# Patient Record
Sex: MALE | Race: WHITE | NOT HISPANIC OR LATINO | URBAN - METROPOLITAN AREA
[De-identification: names, ages, dates, MRNs, and addresses within clinical notes are randomized per-mention and may not be internally consistent; named-entity substitution may affect disease eponyms.]

---

## 2018-04-09 ENCOUNTER — LAB REQUISITION (OUTPATIENT)
Dept: LAB | Facility: HOSPITAL | Age: 63
End: 2018-04-09

## 2018-04-09 DIAGNOSIS — C44.310 BASAL CELL CARCINOMA OF SKIN OF FACE: ICD-10-CM

## 2018-04-09 PROCEDURE — 88332 PATH CONSLTJ SURG EA ADD BLK: CPT | Performed by: PATHOLOGY

## 2018-04-09 PROCEDURE — 88305 TISSUE EXAM BY PATHOLOGIST: CPT | Performed by: PLASTIC SURGERY

## 2018-04-10 LAB
CASE RPRT: NORMAL
CLINICAL INFO: NORMAL
PATH REPORT.FINAL DX SPEC: NORMAL
PATH REPORT.GROSS SPEC: NORMAL
PATH REPORT.INTRAOP OBS SPEC DOC: NORMAL

## 2019-09-23 ENCOUNTER — FORM ENCOUNTER (OUTPATIENT)
Age: 64
End: 2019-09-23

## 2019-09-24 ENCOUNTER — OUTPATIENT (OUTPATIENT)
Dept: OUTPATIENT SERVICES | Facility: HOSPITAL | Age: 64
LOS: 1 days | End: 2019-09-24
Payer: COMMERCIAL

## 2019-09-24 ENCOUNTER — APPOINTMENT (OUTPATIENT)
Dept: ORTHOPEDIC SURGERY | Facility: CLINIC | Age: 64
End: 2019-09-24
Payer: COMMERCIAL

## 2019-09-24 VITALS
WEIGHT: 210 LBS | DIASTOLIC BLOOD PRESSURE: 80 MMHG | BODY MASS INDEX: 29.4 KG/M2 | HEIGHT: 71 IN | SYSTOLIC BLOOD PRESSURE: 130 MMHG

## 2019-09-24 DIAGNOSIS — M25.562 PAIN IN LEFT KNEE: ICD-10-CM

## 2019-09-24 PROBLEM — Z00.00 ENCOUNTER FOR PREVENTIVE HEALTH EXAMINATION: Status: ACTIVE | Noted: 2019-09-24

## 2019-09-24 PROCEDURE — 73564 X-RAY EXAM KNEE 4 OR MORE: CPT | Mod: 26,50

## 2019-09-24 PROCEDURE — 73564 X-RAY EXAM KNEE 4 OR MORE: CPT

## 2019-09-24 PROCEDURE — 99203 OFFICE O/P NEW LOW 30 MIN: CPT

## 2019-10-03 PROBLEM — M25.562 LEFT MEDIAL KNEE PAIN: Status: ACTIVE | Noted: 2019-10-03

## 2019-10-03 NOTE — PHYSICAL EXAM
[de-identified] : The patient is a well developed, well nourished male in no apparent distress. He is alert and oriented X 3 with a pleasant mood and appropriate affect. \par \par On physical examination of the left knee, his ROM is 0-125 degrees. The patient walks with a normal gait and stands in neutral alignment. There is no effusion. No warmth or erythema is noted. The patella is non tender to palpation medially or laterally. There is no crepitus noted. The apprehension and grind tests are negative. The extensor mechanism is intact. There is medial joint line tenderness. The Bailey sign is absent. The Lachman and pivot shift tests are negative. There is no varus or valgus laxity at 0 or 30 degrees. No posterolateral or anteromedial laxity is noted. No masses are palpable. No other soft tissue or bony tenderness is noted. Quadriceps weakness is noted. Neurovascular function is intact.   [de-identified] : Radiographs show well aligned and well maintained joint spacing bilaterally. There is hardware from previous ligamentous reconstructions

## 2019-10-03 NOTE — DISCUSSION/SUMMARY
[de-identified] : Mr Caballero has medial knee discomfort consistent with a medial meniscus tear. He will continue on with activities as tolerated. he will modify his home exercise program. If unimproved we will consider an MRI. All questions were answered. He will call if any issues arise.

## 2019-10-03 NOTE — END OF VISIT
[FreeTextEntry3] : All medical record entries made by IAN Curran, acting as a scribe for this encounter under the direction of Marco Sebastian MD . I have reviewed the chart and agree that the record accurately reflects my personal performance of the history, physical exam, assessment and plan. I have also personally directed, reviewed, and agreed with the chart.

## 2019-10-03 NOTE — HISTORY OF PRESENT ILLNESS
[de-identified] : Hany Caballero is a 65 yo gentleman with left knee pain for the last few months. He had ACL, PCL, MCL reconstruction in 1989. He has remained quite active over the years. He plays a wide variety of sports without issue. Several months ago he developed medial knee pain. He has had discomfort and stiffness. He denies any swelling, locking or buckling. He takes advil with some relief.

## 2022-07-30 ENCOUNTER — APPOINTMENT (EMERGENCY)
Dept: RADIOLOGY | Facility: HOSPITAL | Age: 67
End: 2022-07-30
Attending: EMERGENCY MEDICINE
Payer: COMMERCIAL

## 2022-07-30 ENCOUNTER — HOSPITAL ENCOUNTER (OUTPATIENT)
Facility: HOSPITAL | Age: 67
Setting detail: OBSERVATION
Discharge: HOME | End: 2022-08-01
Attending: EMERGENCY MEDICINE | Admitting: HOSPITALIST
Payer: COMMERCIAL

## 2022-07-30 DIAGNOSIS — C61 PROSTATE CANCER (CMS/HCC): ICD-10-CM

## 2022-07-30 DIAGNOSIS — I26.99 PULMONARY EMBOLISM, OTHER, UNSPECIFIED CHRONICITY, UNSPECIFIED WHETHER ACUTE COR PULMONALE PRESENT (CMS/HCC): ICD-10-CM

## 2022-07-30 DIAGNOSIS — I48.20 CHRONIC ATRIAL FIBRILLATION (CMS/HCC): ICD-10-CM

## 2022-07-30 DIAGNOSIS — R06.02 SHORTNESS OF BREATH: Primary | ICD-10-CM

## 2022-07-30 DIAGNOSIS — I26.99 ACUTE PULMONARY EMBOLISM WITHOUT ACUTE COR PULMONALE, UNSPECIFIED PULMONARY EMBOLISM TYPE (CMS/HCC): ICD-10-CM

## 2022-07-30 PROBLEM — E78.5 DYSLIPIDEMIA: Status: ACTIVE | Noted: 2022-07-30

## 2022-07-30 LAB
ALBUMIN SERPL-MCNC: 3.8 G/DL (ref 3.4–5)
ALP SERPL-CCNC: 68 IU/L (ref 35–126)
ALT SERPL-CCNC: 55 IU/L (ref 16–63)
ANION GAP SERPL CALC-SCNC: 8 MEQ/L (ref 3–15)
APTT PPP: 35 SEC (ref 23–35)
AST SERPL-CCNC: 45 IU/L (ref 15–41)
BASOPHILS # BLD: 0.04 K/UL (ref 0.01–0.1)
BASOPHILS NFR BLD: 0.3 %
BILIRUB SERPL-MCNC: 1 MG/DL (ref 0.3–1.2)
BNP SERPL-MCNC: 97 PG/ML
BUN SERPL-MCNC: 20 MG/DL (ref 8–20)
CALCIUM SERPL-MCNC: 8.8 MG/DL (ref 8.9–10.3)
CHLORIDE SERPL-SCNC: 101 MEQ/L (ref 98–109)
CO2 SERPL-SCNC: 26 MEQ/L (ref 22–32)
CREAT SERPL-MCNC: 1.6 MG/DL (ref 0.8–1.3)
D DIMER PPP IA.FEU-MCNC: >20 UG/ML FEU (ref 0–0.5)
DIFFERENTIAL METHOD BLD: ABNORMAL
EOSINOPHIL # BLD: 0.28 K/UL (ref 0.04–0.54)
EOSINOPHIL NFR BLD: 2.4 %
ERYTHROCYTE [DISTWIDTH] IN BLOOD BY AUTOMATED COUNT: 13.5 % (ref 11.6–14.4)
GFR SERPL CREATININE-BSD FRML MDRD: 43.6 ML/MIN/1.73M*2
GLUCOSE SERPL-MCNC: 136 MG/DL (ref 70–99)
HCT VFR BLDCO AUTO: 43.4 % (ref 40.1–51)
HGB BLD-MCNC: 14.1 G/DL (ref 13.7–17.5)
IMM GRANULOCYTES # BLD AUTO: 0.06 K/UL (ref 0–0.08)
IMM GRANULOCYTES NFR BLD AUTO: 0.5 %
INR PPP: 1.1
LYMPHOCYTES # BLD: 0.97 K/UL (ref 1.2–3.5)
LYMPHOCYTES NFR BLD: 8.3 %
MCH RBC QN AUTO: 28.3 PG (ref 28–33.2)
MCHC RBC AUTO-ENTMCNC: 32.5 G/DL (ref 32.2–36.5)
MCV RBC AUTO: 87 FL (ref 83–98)
MONOCYTES # BLD: 1.03 K/UL (ref 0.3–1)
MONOCYTES NFR BLD: 8.8 %
NEUTROPHILS # BLD: 9.35 K/UL (ref 1.7–7)
NEUTS SEG NFR BLD: 79.7 %
NRBC BLD-RTO: 0 %
PDW BLD AUTO: 9.9 FL (ref 9.4–12.4)
PLATELET # BLD AUTO: 185 K/UL (ref 150–350)
POTASSIUM SERPL-SCNC: 4.2 MEQ/L (ref 3.6–5.1)
PROT SERPL-MCNC: 6.5 G/DL (ref 6–8.2)
PROTHROMBIN TIME: 14.3 SEC (ref 12.2–14.5)
RBC # BLD AUTO: 4.99 M/UL (ref 4.5–5.8)
SARS-COV-2 RNA RESP QL NAA+PROBE: NEGATIVE
SODIUM SERPL-SCNC: 135 MEQ/L (ref 136–144)
TROPONIN I SERPL HS-MCNC: 7.7 PG/ML
TROPONIN I SERPL HS-MCNC: 8.6 PG/ML
WBC # BLD AUTO: 11.73 K/UL (ref 3.8–10.5)

## 2022-07-30 PROCEDURE — 99284 EMERGENCY DEPT VISIT MOD MDM: CPT | Mod: 25

## 2022-07-30 PROCEDURE — 99285 EMERGENCY DEPT VISIT HI MDM: CPT | Mod: 25

## 2022-07-30 PROCEDURE — G0378 HOSPITAL OBSERVATION PER HR: HCPCS

## 2022-07-30 PROCEDURE — 85025 COMPLETE CBC W/AUTO DIFF WBC: CPT | Performed by: PHYSICIAN ASSISTANT

## 2022-07-30 PROCEDURE — 71045 X-RAY EXAM CHEST 1 VIEW: CPT

## 2022-07-30 PROCEDURE — 96366 THER/PROPH/DIAG IV INF ADDON: CPT

## 2022-07-30 PROCEDURE — 63600105 HC IODINE BASED CONTRAST: Performed by: EMERGENCY MEDICINE

## 2022-07-30 PROCEDURE — 99220 PR INITIAL OBSERVATION CARE/DAY 70 MINUTES: CPT | Performed by: HOSPITALIST

## 2022-07-30 PROCEDURE — 96365 THER/PROPH/DIAG IV INF INIT: CPT | Mod: 59

## 2022-07-30 PROCEDURE — 63600000 HC DRUGS/DETAIL CODE: Performed by: EMERGENCY MEDICINE

## 2022-07-30 PROCEDURE — 83880 ASSAY OF NATRIURETIC PEPTIDE: CPT | Performed by: PHYSICIAN ASSISTANT

## 2022-07-30 PROCEDURE — U0003 INFECTIOUS AGENT DETECTION BY NUCLEIC ACID (DNA OR RNA); SEVERE ACUTE RESPIRATORY SYNDROME CORONAVIRUS 2 (SARS-COV-2) (CORONAVIRUS DISEASE [COVID-19]), AMPLIFIED PROBE TECHNIQUE, MAKING USE OF HIGH THROUGHPUT TECHNOLOGIES AS DESCRIBED BY CMS-2020-01-R: HCPCS | Performed by: EMERGENCY MEDICINE

## 2022-07-30 PROCEDURE — 84484 ASSAY OF TROPONIN QUANT: CPT | Performed by: PHYSICIAN ASSISTANT

## 2022-07-30 PROCEDURE — 12000000 HC ROOM AND CARE MED/SURG

## 2022-07-30 PROCEDURE — 93005 ELECTROCARDIOGRAM TRACING: CPT | Performed by: PHYSICIAN ASSISTANT

## 2022-07-30 PROCEDURE — 71275 CT ANGIOGRAPHY CHEST: CPT | Mod: ME

## 2022-07-30 PROCEDURE — 85610 PROTHROMBIN TIME: CPT | Performed by: PHYSICIAN ASSISTANT

## 2022-07-30 PROCEDURE — 84484 ASSAY OF TROPONIN QUANT: CPT | Mod: 91 | Performed by: PHYSICIAN ASSISTANT

## 2022-07-30 PROCEDURE — 36415 COLL VENOUS BLD VENIPUNCTURE: CPT | Performed by: PHYSICIAN ASSISTANT

## 2022-07-30 PROCEDURE — 80053 COMPREHEN METABOLIC PANEL: CPT | Performed by: PHYSICIAN ASSISTANT

## 2022-07-30 PROCEDURE — 85379 FIBRIN DEGRADATION QUANT: CPT | Performed by: PHYSICIAN ASSISTANT

## 2022-07-30 PROCEDURE — 85730 THROMBOPLASTIN TIME PARTIAL: CPT | Performed by: EMERGENCY MEDICINE

## 2022-07-30 RX ORDER — AMOXICILLIN 250 MG
1 CAPSULE ORAL 2 TIMES DAILY
Status: DISCONTINUED | OUTPATIENT
Start: 2022-07-30 | End: 2022-08-01 | Stop reason: HOSPADM

## 2022-07-30 RX ORDER — OXYCODONE HYDROCHLORIDE 5 MG/1
5 TABLET ORAL EVERY 4 HOURS PRN
COMMUNITY

## 2022-07-30 RX ORDER — ATORVASTATIN CALCIUM 80 MG/1
80 TABLET, FILM COATED ORAL
COMMUNITY
Start: 2022-07-12

## 2022-07-30 RX ORDER — IBUPROFEN 200 MG
16-32 TABLET ORAL AS NEEDED
Status: DISCONTINUED | OUTPATIENT
Start: 2022-07-30 | End: 2022-08-01 | Stop reason: HOSPADM

## 2022-07-30 RX ORDER — DEXTROSE 50 % IN WATER (D50W) INTRAVENOUS SYRINGE
25 AS NEEDED
Status: DISCONTINUED | OUTPATIENT
Start: 2022-07-30 | End: 2022-08-01 | Stop reason: HOSPADM

## 2022-07-30 RX ORDER — DILTIAZEM HYDROCHLORIDE 240 MG/1
240 CAPSULE, COATED, EXTENDED RELEASE ORAL DAILY
Status: DISCONTINUED | OUTPATIENT
Start: 2022-07-31 | End: 2022-08-01 | Stop reason: HOSPADM

## 2022-07-30 RX ORDER — ACETAMINOPHEN 500 MG
500 TABLET ORAL 2 TIMES DAILY
COMMUNITY

## 2022-07-30 RX ORDER — OXYBUTYNIN CHLORIDE 5 MG/1
5 TABLET ORAL 3 TIMES DAILY
Status: DISCONTINUED | OUTPATIENT
Start: 2022-07-31 | End: 2022-08-01 | Stop reason: HOSPADM

## 2022-07-30 RX ORDER — DEXTROSE 40 %
15-30 GEL (GRAM) ORAL AS NEEDED
Status: DISCONTINUED | OUTPATIENT
Start: 2022-07-30 | End: 2022-08-01 | Stop reason: HOSPADM

## 2022-07-30 RX ORDER — OXYCODONE HYDROCHLORIDE 5 MG/1
5 TABLET ORAL EVERY 4 HOURS PRN
Status: DISCONTINUED | OUTPATIENT
Start: 2022-07-30 | End: 2022-08-01 | Stop reason: HOSPADM

## 2022-07-30 RX ORDER — OXYBUTYNIN CHLORIDE 5 MG/1
5 TABLET ORAL 3 TIMES DAILY
COMMUNITY

## 2022-07-30 RX ORDER — HEPARIN SODIUM 10000 [USP'U]/100ML
100-4000 INJECTION, SOLUTION INTRAVENOUS
Status: DISCONTINUED | OUTPATIENT
Start: 2022-07-30 | End: 2022-07-31

## 2022-07-30 RX ORDER — DILTIAZEM HYDROCHLORIDE 240 MG/1
CAPSULE, EXTENDED RELEASE ORAL
Status: ON HOLD | COMMUNITY
Start: 2022-05-08 | End: 2022-08-01

## 2022-07-30 RX ORDER — LEVOFLOXACIN 500 MG/1
500 TABLET, FILM COATED ORAL DAILY
COMMUNITY
End: 2022-08-01 | Stop reason: HOSPADM

## 2022-07-30 RX ORDER — ATORVASTATIN CALCIUM 80 MG/1
80 TABLET, FILM COATED ORAL
Status: DISCONTINUED | OUTPATIENT
Start: 2022-07-31 | End: 2022-08-01 | Stop reason: HOSPADM

## 2022-07-30 RX ORDER — AMOXICILLIN 250 MG
1 CAPSULE ORAL 2 TIMES DAILY
COMMUNITY

## 2022-07-30 RX ADMIN — IOHEXOL 100 ML: 350 INJECTION, SOLUTION INTRAVENOUS at 18:48

## 2022-07-30 RX ADMIN — HEPARIN SODIUM 1550 UNITS/HR: 10000 INJECTION, SOLUTION INTRAVENOUS at 20:18

## 2022-07-30 ASSESSMENT — ENCOUNTER SYMPTOMS
BLOOD IN STOOL: 0
SHORTNESS OF BREATH: 1
ARTHRALGIAS: 0
DIAPHORESIS: 0
DYSURIA: 0
FLANK PAIN: 0
CHILLS: 0
EYE PAIN: 0
SORE THROAT: 0
ABDOMINAL PAIN: 1
BACK PAIN: 0
HEMOPTYSIS: 0
COLOR CHANGE: 0
SYNCOPE: 0
COUGH: 0
DIZZINESS: 0
HEMATURIA: 0
FEVER: 0
SEIZURES: 0
PALPITATIONS: 0
HEADACHES: 0
NAUSEA: 0
FREQUENCY: 0
VOMITING: 0
SPUTUM PRODUCTION: 0
LIGHT-HEADEDNESS: 0

## 2022-07-30 NOTE — ED PROVIDER NOTES
Emergency Medicine Note  HPI   HISTORY OF PRESENT ILLNESS     67yo M w/hx of MARLENI-opal, on eliquis,  POD#4 s/p prostatectomy, c/o SOB that started today after pt returned from a walk. Pt states he went out for a walk as insructed by his surgeon and became fatigued, returned home and went to lie down in bed @~10am. Shortly after lying down, pt had sudden onset of SOB, which has been waxing and waning x 4 hours.      History provided by:  Patient  Shortness of Breath  Severity:  Moderate  Onset quality:  Sudden  Duration:  4 hours  Timing:  Constant  Progression:  Waxing and waning  Chronicity:  New  Relieved by:  Nothing  Exacerbated by: lying flat.  Ineffective treatments:  None tried  Associated symptoms: abdominal pain (Post-op pain, unchanged today)    Associated symptoms: no chest pain, no cough, no diaphoresis, no ear pain, no fever, no headaches, no hemoptysis, no rash, no sore throat, no sputum production, no syncope and no vomiting          Patient History   PAST HISTORY     Reviewed from Nursing Triage:         Past Medical History:   Diagnosis Date   • Prostate CA (CMS/HCC)        No past surgical history on file.    No family history on file.    Social History     Tobacco Use   • Smoking status: Never Smoker   • Smokeless tobacco: Never Used   Substance Use Topics   • Alcohol use: Not Currently         Review of Systems   REVIEW OF SYSTEMS     Review of Systems   Constitutional: Negative for chills, diaphoresis and fever.   HENT: Negative for ear pain and sore throat.    Eyes: Negative for pain and visual disturbance.   Respiratory: Positive for shortness of breath. Negative for cough, hemoptysis and sputum production.    Cardiovascular: Negative for chest pain, palpitations, leg swelling and syncope.   Gastrointestinal: Positive for abdominal pain (Post-op pain, unchanged today). Negative for blood in stool, nausea and vomiting.   Genitourinary: Negative for dysuria, flank pain, frequency, hematuria and  urgency.   Musculoskeletal: Negative for arthralgias and back pain.   Skin: Negative for color change and rash.   Neurological: Negative for dizziness, seizures, syncope, light-headedness and headaches.   All other systems reviewed and are negative.        VITALS     ED Vitals    Date/Time Temp Pulse Resp BP SpO2 [facility]   07/30/22 2015 -- 86 20 135/104 97 % DT   07/30/22 1910 37 °C (98.6 °F) 89 19 144/108 95 % DT   07/30/22 1822 -- 86 12 138/95 96 % ML   07/30/22 1536 36.7 °C (98 °F) 97 20 119/74 97 % MG        Pulse Ox %: 95 % (07/30/22 2047)  Pulse Ox Interpretation: Normal (07/30/22 2047)  Heart Rate: 89 (07/30/22 2047)  Rhythm Strip Interpretation: Atrial Fibrillation (07/30/22 2047)     Physical Exam   PHYSICAL EXAM     Physical Exam  Vitals and nursing note reviewed.   Constitutional:       General: He is not in acute distress.     Appearance: He is well-developed. He is not toxic-appearing.   HENT:      Head: Normocephalic and atraumatic.      Mouth/Throat:      Mouth: Mucous membranes are moist.      Pharynx: No oropharyngeal exudate or posterior oropharyngeal erythema.   Eyes:      Conjunctiva/sclera: Conjunctivae normal.      Pupils: Pupils are equal, round, and reactive to light.   Cardiovascular:      Rate and Rhythm: Normal rate. Rhythm irregularly irregular.      Pulses: Normal pulses.      Heart sounds: Normal heart sounds. No murmur heard.  Pulmonary:      Effort: Pulmonary effort is normal. No respiratory distress.      Breath sounds: Normal breath sounds.   Abdominal:      General: Bowel sounds are normal.      Palpations: Abdomen is soft.      Tenderness: There is abdominal tenderness (Mild, lower abdomen adjacent to surgical incisions).      Comments: +transverse incisions @lower abdomen x3, no erythema/swelling/discharge.   Musculoskeletal:         General: No swelling or tenderness. Normal range of motion.      Cervical back: Normal range of motion and neck supple.      Right lower leg: No edema.       Left lower leg: No edema.   Skin:     General: Skin is warm and dry.      Coloration: Skin is not pale.      Findings: No rash.   Neurological:      General: No focal deficit present.      Mental Status: He is alert and oriented to person, place, and time.      Cranial Nerves: No cranial nerve deficit.      Sensory: No sensory deficit.      Motor: No weakness.           PROCEDURES     Critical Care  Performed by: Taran Mack DO  Authorized by: Taran Mack DO     Critical care provider statement:     Critical care time (minutes):  35    Critical care time was exclusive of:  Separately billable procedures and treating other patients    Critical care was necessary to treat or prevent imminent or life-threatening deterioration of the following conditions: Pulmonary embolism.    Critical care was time spent personally by me on the following activities:  Development of treatment plan with patient or surrogate, discussions with consultants, evaluation of patient's response to treatment, examination of patient, obtaining history from patient or surrogate, ordering and performing treatments and interventions, ordering and review of laboratory studies, ordering and review of radiographic studies, pulse oximetry and re-evaluation of patient's condition         DATA     Results     Procedure Component Value Units Date/Time    PTT [[id number]]  (Normal) Collected: 07/30/22 1548    Specimen: Blood, Venous Updated: 07/30/22 2012     PTT 35 sec     Narrative:      Draw PT/INR ASAP prior to starting heparin infusion.    D-dimer, quantitative [[id number]]  (Abnormal) Collected: 07/30/22 1548    Specimen: Blood, Venous Updated: 07/30/22 1654     D-Dimer, Quant >20.00 ug/mL FEU      Comment: Suggested Age Related Reference Range: 0.00 - 0.65  The D-Dimer assay can be used as an aid in the diagnosis of DVT or PE. The test can not be used by itself to exclude DVT or PE. When used as a diagnostic aid, the cutoff value  is the same as the reference range: <0.5 ug/ml FEU.  Checked by dilution.       HS Troponin I (with 2 hour reflex) [[id number]]  (Normal) Collected: 07/30/22 1548    Specimen: Blood, Venous Updated: 07/30/22 1638     High Sens Troponin I 8.6 pg/mL     B-type natriuretic peptide [[id number]]  (Normal) Collected: 07/30/22 1548    Specimen: Blood, Venous Updated: 07/30/22 1632     BNP 97 pg/mL     Comprehensive metabolic panel [[id number]]  (Abnormal) Collected: 07/30/22 1548    Specimen: Blood, Venous Updated: 07/30/22 1628     Sodium 135 mEQ/L      Potassium 4.2 mEQ/L      Comment: Results obtained on plasma. Plasma Potassium values may be up to 0.4 mEQ/L less than serum values. The differences may be greater for patients with high platelet or white cell counts.        Chloride 101 mEQ/L      CO2 26 mEQ/L      BUN 20 mg/dL      Creatinine 1.6 mg/dL      Glucose 136 mg/dL      Calcium 8.8 mg/dL      AST (SGOT) 45 IU/L      ALT (SGPT) 55 IU/L      Alkaline Phosphatase 68 IU/L      Total Protein 6.5 g/dL      Comment: Test performed on plasma which typically contains approximately 0.4 g/dL more protein than serum.        Albumin 3.8 g/dL      Bilirubin, Total 1.0 mg/dL      eGFR 43.6 mL/min/1.73m*2      Anion Gap 8 mEQ/L     Protime-INR [[id number]]  (Normal) Collected: 07/30/22 1548    Specimen: Blood, Venous Updated: 07/30/22 1618     PT 14.3 sec      INR 1.1     Comment: INR has no defined significance when PT is within Reference Range.       CBC and differential [[id number]]  (Abnormal) Collected: 07/30/22 1548    Specimen: Blood, Venous Updated: 07/30/22 1611     WBC 11.73 K/uL      RBC 4.99 M/uL      Hemoglobin 14.1 g/dL      Hematocrit 43.4 %      MCV 87.0 fL      MCH 28.3 pg      MCHC 32.5 g/dL      RDW 13.5 %      Platelets 185 K/uL      MPV 9.9 fL      Differential Type Auto     nRBC 0.0 %      Immature Granulocytes 0.5 %      Neutrophils 79.7 %      Lymphocytes 8.3 %      Monocytes 8.8 %      Eosinophils 2.4 %       Basophils 0.3 %      Immature Granulocytes, Absolute 0.06 K/uL      Neutrophils, Absolute 9.35 K/uL      Lymphocytes, Absolute 0.97 K/uL      Monocytes, Absolute 1.03 K/uL      Eosinophils, Absolute 0.28 K/uL      Basophils, Absolute 0.04 K/uL           Imaging Results          CT ANGIOGRAPHY CHEST PULMONARY EMBOLISM WITH IV CONTRAST (Final result)  Result time 07/30/22 19:09:47    Final result                 Impression:    IMPRESSION:  1.  Multiple bilateral segmental and subsegmental pulmonary emboli.  2.  Small left pneumothorax.  3.  Pneumoperitoneum in the visualized upper abdomen.  4.  Bibasilar atelectasis.  5.  Few tiny indeterminate sclerotic foci in the thoracic spine.  Consider  outpatient bone scan for further evaluation          Finding: New pneumothorax.  Pulmonary embolism. New pneumoperitoneum.   Acuity:  Critical  Status:  CLOSED    Critical read back was performed and results were read back by CLIVE PERDUE M.D. on 7/30/2022 7:02 PM          As per [place] Act 112, known as the Patient Test Result Information Act, a letter  will be sent to the patient to notify them that a significant abnormality may  exist.                 Narrative:    CLINICAL HISTORY:  Pulmonary embolism (PE) suspected, positive D-dimer    COMPARISON: Chest x-ray performed earlier the same day    TECHNIQUE: CT examination of the chest with attention to the pulmonary arteries  was performed from the diaphragms to the lung apices following the intravenous  administration of contrast material. Maximum intensity projection (MIP)  reconstructions were included for evaluation of pulmonary embolism.  Three-dimensional postprocessing was also performed of the vasculature. Dose  reduction techniques such as automated exposure control were used in this study  where applicable.  IV contrast: 100mL of iohexoL (OMNIPAQUE 350) 350 mg iodine/mL solution 100 mL    COMMENT:  The main pulmonary artery is normal in caliber.  Multiple tubular  filling defects are seen in the segmental subsegmental branches  within the right upper, left upper and right middle lobes.  The right lower  lobes are suboptimally assessed due to motion.    The visualized thyroid gland is unremarkable.  No thoracic lymphadenopathy.    No aortic aneurysm.    Normal cardiac size. No pericardial effusion.    The visualized upper abdomen shows a small amount of pneumoperitoneum.  The  solid abdominal viscera shows no definite abnormality.  The stomach is partially  imaged but distended.    The central airways are patent.    Evaluation lungs is limited by mild motion artifact.  There is bibasilar  atelectasis.  No dominant pulmonary nodules.    There is a small left-sided pneumothorax.  No right pneumothorax.  No pleural  effusion.    Mild thoracic spondylosis.  There are a few indeterminate tiny sclerotic foci in  the thoracic spine.                                 X-RAY CHEST 1 VIEW (Final result)  Result time 07/30/22 15:54:29    Final result                 Impression:    IMPRESSION:  No active disease in the chest.               Narrative:    Single frontal view chest x-ray    CLINICAL HISTORY:  Shortness of breath    COMPARISON: None    COMMENT:    The lungs are clear without infiltrate, effusion or pneumothorax. The  cardiomediastinal silhouette is within normal limits.                                  ECG 12 lead   ED Interpretation   Rhythm: [Atrial fibrillation]  Rate: 94  P waves: [Absent]  QRS: [normal QRS]  Axis: [normal]  ST Segments: [no obvious ST elevation or ischemia]                  Scoring tools                                 ED Course & MDM   MDM / ED COURSE / CLINICAL IMPRESSIONS / DISPO     [facility]    ED Course as of 07/30/22 2120   Sat Jul 30, 2022 2007 D/w Dr. Francis, agrees with IV heparin, admit for further evaluation. [HB]   2119 Case was discussed with hospitalist.  Reviewed patient's presentation, ER course, and relevant data.  Hospitalist accepts patient on  their service and will see / admit pt.    [HB]      ED Course User Index  [HB] Taran Mack DO     Admit / Observation         Taran Mack DO  07/30/22 2120

## 2022-07-31 ENCOUNTER — APPOINTMENT (INPATIENT)
Dept: RADIOLOGY | Facility: HOSPITAL | Age: 67
End: 2022-07-31
Attending: HOSPITALIST
Payer: COMMERCIAL

## 2022-07-31 PROBLEM — N17.9 ACUTE KIDNEY INJURY (CMS/HCC): Status: ACTIVE | Noted: 2022-07-31

## 2022-07-31 PROBLEM — R93.89 ABNORMAL CT OF THE CHEST: Status: ACTIVE | Noted: 2022-07-31

## 2022-07-31 LAB
ANION GAP SERPL CALC-SCNC: 7 MEQ/L (ref 3–15)
APTT PPP: 100 SEC (ref 23–35)
APTT PPP: 78 SEC (ref 23–35)
ATRIAL RATE: 102
BUN SERPL-MCNC: 19 MG/DL (ref 8–20)
CALCIUM SERPL-MCNC: 8.6 MG/DL (ref 8.9–10.3)
CHLORIDE SERPL-SCNC: 107 MEQ/L (ref 98–109)
CO2 SERPL-SCNC: 24 MEQ/L (ref 22–32)
CREAT SERPL-MCNC: 1.5 MG/DL (ref 0.8–1.3)
ERYTHROCYTE [DISTWIDTH] IN BLOOD BY AUTOMATED COUNT: 13.6 % (ref 11.6–14.4)
GFR SERPL CREATININE-BSD FRML MDRD: 47 ML/MIN/1.73M*2
GLUCOSE SERPL-MCNC: 118 MG/DL (ref 70–99)
HCT VFR BLDCO AUTO: 41.9 % (ref 40.1–51)
HGB BLD-MCNC: 13.8 G/DL (ref 13.7–17.5)
MCH RBC QN AUTO: 28.3 PG (ref 28–33.2)
MCHC RBC AUTO-ENTMCNC: 32.9 G/DL (ref 32.2–36.5)
MCV RBC AUTO: 86 FL (ref 83–98)
PDW BLD AUTO: 10 FL (ref 9.4–12.4)
PLATELET # BLD AUTO: 179 K/UL (ref 150–350)
POTASSIUM SERPL-SCNC: 4.6 MEQ/L (ref 3.6–5.1)
QRS DURATION: 94
QT INTERVAL: 370
QTC CALCULATION(BAZETT): 462
R AXIS: 9
RBC # BLD AUTO: 4.87 M/UL (ref 4.5–5.8)
SODIUM SERPL-SCNC: 138 MEQ/L (ref 136–144)
T WAVE AXIS: -9
VENTRICULAR RATE: 94
WBC # BLD AUTO: 10.22 K/UL (ref 3.8–10.5)

## 2022-07-31 PROCEDURE — 63700000 HC SELF-ADMINISTRABLE DRUG

## 2022-07-31 PROCEDURE — G0378 HOSPITAL OBSERVATION PER HR: HCPCS

## 2022-07-31 PROCEDURE — 85730 THROMBOPLASTIN TIME PARTIAL: CPT | Performed by: HOSPITALIST

## 2022-07-31 PROCEDURE — 25800000 HC PHARMACY IV SOLUTIONS: Performed by: HOSPITALIST

## 2022-07-31 PROCEDURE — 63700000 HC SELF-ADMINISTRABLE DRUG: Performed by: HOSPITALIST

## 2022-07-31 PROCEDURE — 25800000 HC PHARMACY IV SOLUTIONS

## 2022-07-31 PROCEDURE — 85027 COMPLETE CBC AUTOMATED: CPT

## 2022-07-31 PROCEDURE — 63600000 HC DRUGS/DETAIL CODE

## 2022-07-31 PROCEDURE — 99226 PR SBSQ OBSERVATION CARE/DAY 35 MINUTES: CPT | Performed by: HOSPITALIST

## 2022-07-31 PROCEDURE — 74022 RADEX COMPL AQT ABD SERIES: CPT

## 2022-07-31 PROCEDURE — 80048 BASIC METABOLIC PNL TOTAL CA: CPT

## 2022-07-31 PROCEDURE — 36415 COLL VENOUS BLD VENIPUNCTURE: CPT | Performed by: HOSPITALIST

## 2022-07-31 PROCEDURE — 20600000 HC ROOM AND CARE INTERMEDIATE/TELEMETRY

## 2022-07-31 PROCEDURE — 96366 THER/PROPH/DIAG IV INF ADDON: CPT

## 2022-07-31 PROCEDURE — 92100002 US VENOUS LEG BILATERAL

## 2022-07-31 RX ORDER — DILTIAZEM HYDROCHLORIDE 240 MG/1
CAPSULE, COATED, EXTENDED RELEASE ORAL
COMMUNITY
Start: 2022-07-12

## 2022-07-31 RX ORDER — ACETAMINOPHEN 325 MG/1
650 TABLET ORAL EVERY 4 HOURS PRN
Status: DISCONTINUED | OUTPATIENT
Start: 2022-07-31 | End: 2022-08-01 | Stop reason: HOSPADM

## 2022-07-31 RX ORDER — SODIUM CHLORIDE 9 MG/ML
INJECTION, SOLUTION INTRAVENOUS CONTINUOUS
Status: DISCONTINUED | OUTPATIENT
Start: 2022-07-31 | End: 2022-07-31

## 2022-07-31 RX ORDER — SODIUM CHLORIDE 9 MG/ML
INJECTION, SOLUTION INTRAVENOUS CONTINUOUS
Status: DISCONTINUED | OUTPATIENT
Start: 2022-07-31 | End: 2022-08-01

## 2022-07-31 RX ORDER — MELATONIN 5 MG
5 CAPSULE ORAL ONCE
Status: COMPLETED | OUTPATIENT
Start: 2022-07-31 | End: 2022-07-31

## 2022-07-31 RX ORDER — LEVOFLOXACIN 500 MG/1
500 TABLET, FILM COATED ORAL ONCE
Status: COMPLETED | OUTPATIENT
Start: 2022-07-31 | End: 2022-07-31

## 2022-07-31 RX ADMIN — SODIUM CHLORIDE: 9 INJECTION, SOLUTION INTRAVENOUS at 23:21

## 2022-07-31 RX ADMIN — OXYBUTYNIN CHLORIDE 5 MG: 5 TABLET ORAL at 09:02

## 2022-07-31 RX ADMIN — ATORVASTATIN CALCIUM 80 MG: 80 TABLET, FILM COATED ORAL at 17:27

## 2022-07-31 RX ADMIN — OXYBUTYNIN CHLORIDE 5 MG: 5 TABLET ORAL at 14:47

## 2022-07-31 RX ADMIN — SENNOSIDES AND DOCUSATE SODIUM 1 TABLET: 50; 8.6 TABLET ORAL at 00:58

## 2022-07-31 RX ADMIN — SENNOSIDES AND DOCUSATE SODIUM 1 TABLET: 50; 8.6 TABLET ORAL at 09:02

## 2022-07-31 RX ADMIN — OXYCODONE HYDROCHLORIDE 5 MG: 5 TABLET ORAL at 00:57

## 2022-07-31 RX ADMIN — LEVOFLOXACIN 500 MG: 500 TABLET, FILM COATED ORAL at 14:47

## 2022-07-31 RX ADMIN — SODIUM CHLORIDE: 9 INJECTION, SOLUTION INTRAVENOUS at 09:04

## 2022-07-31 RX ADMIN — SODIUM CHLORIDE 100 ML: 9 INJECTION, SOLUTION INTRAVENOUS at 02:03

## 2022-07-31 RX ADMIN — OXYCODONE HYDROCHLORIDE 5 MG: 5 TABLET ORAL at 15:12

## 2022-07-31 RX ADMIN — Medication 5 MG: at 22:15

## 2022-07-31 RX ADMIN — DILTIAZEM HYDROCHLORIDE 240 MG: 240 CAPSULE, COATED, EXTENDED RELEASE ORAL at 09:02

## 2022-07-31 RX ADMIN — HEPARIN SODIUM 1550 UNITS/HR: 10000 INJECTION, SOLUTION INTRAVENOUS at 08:28

## 2022-07-31 RX ADMIN — OXYBUTYNIN CHLORIDE 5 MG: 5 TABLET ORAL at 20:23

## 2022-07-31 RX ADMIN — ACETAMINOPHEN 650 MG: 325 TABLET ORAL at 12:30

## 2022-07-31 RX ADMIN — APIXABAN 10 MG: 5 TABLET, FILM COATED ORAL at 20:22

## 2022-07-31 RX ADMIN — OXYCODONE HYDROCHLORIDE 5 MG: 5 TABLET ORAL at 20:23

## 2022-07-31 RX ADMIN — SENNOSIDES AND DOCUSATE SODIUM 1 TABLET: 50; 8.6 TABLET ORAL at 20:22

## 2022-07-31 ASSESSMENT — ENCOUNTER SYMPTOMS
COUGH: 0
FATIGUE: 1
PALPITATIONS: 0
WHEEZING: 0
FEVER: 0
STRIDOR: 0
SHORTNESS OF BREATH: 1

## 2022-07-31 ASSESSMENT — PATIENT HEALTH QUESTIONNAIRE - PHQ9: SUM OF ALL RESPONSES TO PHQ9 QUESTIONS 1 & 2: 0

## 2022-07-31 NOTE — ASSESSMENT & PLAN NOTE
Small left pneumothorax noted, awaiting repeat chest x-ray  Pneumoperitoneum likely in the setting of laparoscopic prostatectomy last week  Known history of prostate cancer, likely bone metastasis  Will obtain records from [place]

## 2022-07-31 NOTE — ASSESSMENT & PLAN NOTE
Unclear baseline  Will attempt to access records from [place]  Continue IV fluids  Monitor and replete electrolytes as indicated  Avoid nephrotoxins  Trend BUN/creatinine

## 2022-07-31 NOTE — ASSESSMENT & PLAN NOTE
Pt reports sudden onset SOB that started after he went for a walk  He is POD 5 s/p prostatectomy at [facility]  CTA chest shows multiple bilateral segmental and subsegmental pulmonary emboli   BNP and troponin normal, doubt right heart strain    We will continue heparin drip  Pulmonary consulted  Pneumothorax as well as discussed below  Lower extremity ultrasound ordered  Continue telemetry  Transition to [facility] when able

## 2022-07-31 NOTE — H&P
Hospital Medicine Service -  History & Physical        CHIEF COMPLAINT   Shortness of breath     HISTORY OF PRESENT ILLNESS      Srinivas Constantino is a 66 y.o. male with a past medical history of prostate CA s/p prostatectomy, HLD and A-fib (not on AC) who presents with above complaint. Patient is POD 5 s/p prostatectomy and came to ED for a sudden onset of shortness of breath that started after he returned from a walk. Says that once he returned home from walk, he became fatigued so he lied down in bed and reports ongoing shortness of breath since. Denies chest pain or palpitations. Of note, pt reports a history of atrial fibrillation and follows with a Cardiologist at [place]. States he is not currently on anticoagulation and never has been in the past. Admits to some abdominal pain secondary to surgery. Denies nausea, vomiting, dizziness, headaches, vision changes or syncope.     In ED, VSS.    ED workup significant for Cr 1.6, HS trop 8.6-->7.7, WBC 11.73, d-dimer > 20.    CXR negative for active disease in the chest. CTA chest shows multiple bilateral pulmonary emboli, small left pneumothorax, pneumoperitoneum in the visualized upper abdomen, bibasilar atelectasis.     Pt given heparin bolus and started on IV heparin in ED.     At time of exam patient resting comfortably in bed in NAD. States his girlfriend, Malcolm is surrogate decision maker. Full Code.    PAST MEDICAL AND SURGICAL HISTORY      Past Medical History:   Diagnosis Date   • Prostate CA (CMS/HCC)        No past surgical history on file.    PCP: Pt States, No Pcp    MEDICATIONS      Prior to Admission medications    Medication Sig Start Date End Date Taking? Authorizing Provider   acetaminophen (TYLENOL) 500 mg tablet Take 500 mg by mouth 2 (two) times a day.   Yes ProviderAnastacio MD   levoFLOXacin (LEVAQUIN) 500 mg tablet Take 500 mg by mouth daily.   Yes ProviderAnastacio MD   oxybutynin (DITROPAN) 5 mg tablet Take 5 mg by mouth 3 (three)  times a day.   Yes Anatsacio Fernandez MD   oxyCODONE (ROXICODONE) 5 mg immediate release tablet Take 5 mg by mouth every 4 (four) hours as needed.   Yes Anastacio Fernandez MD   sennosides-docusate sodium (SENOKOT-S) 8.6-50 mg Take 1 tablet by mouth 2 (two) times a day.   Yes Anastacio Fernandez MD   atorvastatin (LIPITOR) 80 mg tablet Take 80 mg by mouth. 7/12/22   Anastacio Fernandez MD   DILT- mg 24 hr capsule Take by mouth once daily. 5/8/22   Anastacio Fernandez MD       ALLERGIES      Patient has no known allergies.    FAMILY HISTORY      No family history on file.    SOCIAL HISTORY      Social History     Socioeconomic History   • Marital status: Single   Tobacco Use   • Smoking status: Never Smoker   • Smokeless tobacco: Never Used   Substance and Sexual Activity   • Alcohol use: Not Currently     Social Determinants of Health     Food Insecurity: No Food Insecurity   • Worried About Running Out of Food in the Last Year: Never true   • Ran Out of Food in the Last Year: Never true       REVIEW OF SYSTEMS      All other systems reviewed and negative except as noted in HPI    PHYSICAL EXAMINATION      Temp:  [36.7 °C (98 °F)-37 °C (98.6 °F)] 36.9 °C (98.4 °F)  Heart Rate:  [81-97] 82  Resp:  [12-22] 17  BP: (119-144)/() 127/90  Body mass index is 31.81 kg/m².    Physical Exam  Constitutional:       General: He is not in acute distress.     Appearance: Normal appearance. He is normal weight. He is not toxic-appearing.   HENT:      Head: Normocephalic and atraumatic.   Eyes:      General: No scleral icterus.        Right eye: No discharge.         Left eye: No discharge.      Extraocular Movements: Extraocular movements intact.      Conjunctiva/sclera: Conjunctivae normal.      Pupils: Pupils are equal, round, and reactive to light.   Cardiovascular:      Rate and Rhythm: Normal rate. Rhythm irregular.      Heart sounds: Normal heart sounds. No murmur heard.    No gallop.   Pulmonary:       "Effort: No respiratory distress.      Breath sounds: No wheezing or rales.      Comments: Decreased effort of breathing  Chest:      Chest wall: No tenderness.   Abdominal:      General: Bowel sounds are normal. There is no distension.      Palpations: Abdomen is soft.      Tenderness: There is abdominal tenderness.      Comments: Laparoscopic incision sites CDI   Musculoskeletal:         General: Normal range of motion.      Cervical back: Normal range of motion.      Right lower leg: No edema.      Left lower leg: No edema.   Skin:     General: Skin is warm and dry.   Neurological:      General: No focal deficit present.      Mental Status: He is alert and oriented to person, place, and time. Mental status is at baseline.   Psychiatric:         Mood and Affect: Mood normal.         Behavior: Behavior normal.         Thought Content: Thought content normal.         LABS / IMAGING / EKG        Labs  I have reviewed the patient's pertinent labs.  Significant abnormals are noted in above A/P.    Imaging  I have independently reviewed the pertinent imaging from the last 24 hrs. and Significant findings include:     CT ANGIOGRAPHY CHEST PULMONARY EMBOLISM WITH IV CONTRAST:  \"IMPRESSION:   1.  Multiple bilateral segmental and subsegmental pulmonary emboli.   2.  Small left pneumothorax.   3.  Pneumoperitoneum in the visualized upper abdomen.   4.  Bibasilar atelectasis.   5.  Few tiny indeterminate sclerotic foci in the thoracic spine.  Consider   outpatient bone scan for further evaluation\"    CXR:  \"IMPRESSION:   No active disease in the chest. \"    SARS-CoV-2 (COVID-19) (no units)   Date/Time Value   07/30/2022 2138 Negative       ECG/Telemetry  I have independently reviewed the ECG. Significant findings include atrial fibrillation.    ASSESSMENT AND PLAN           * Pulmonary embolism, other, unspecified chronicity, unspecified whether acute cor pulmonale present (CMS/[facility])  Assessment & Plan  - Admit IP Tele   - Pt " reports sudden onset SOB that started after he went for a walk  - He is POD 5 s/p prostatectomy at [place]  - Labs significant for d-dimer > 20   - CTA shows multiple bilateral segmental and subsegmental pulmonary emboli  - Started on heparin drip with bolus in ED   - Pulmonology following, agree with IV heparin  - Supplemental O2, wean as able  - Hematology is consulted    Chronic atrial fibrillation (CMS/HCC)  Assessment & Plan  - Rate controlled  - Continue Cardizem   - Pt states he is not on anticoagulation and has never been in the past   - Currently on heparin gtt for pulmonary embolism   - Will likely need lifelong anticoagulation   - TTE is pending  - Cardiology is consulted    Abnormal CT of the chest  Assessment & Plan  1.  Multiple bilateral segmental and subsegmental pulmonary emboli.   2.  Small left pneumothorax.   3.  Pneumoperitoneum in the visualized upper abdomen.   4.  Bibasilar atelectasis.   5.  Few tiny indeterminate sclerotic foci in the thoracic spine.  Consider   outpatient bone scan for further evaluation     - Incentive spirometry   - Supplemental O2, keep sats > 95%    Acute kidney injury (CMS/HCC)  Assessment & Plan  - Cr 1.6 on admission   - Unclear of baseline   - Likely prerenal secondary to dehydration and recent surgery  - Gentle IV fluids   - Avoid nephrotoxins   - Follow bmp     Dyslipidemia  Assessment & Plan  - Chronic condition   - Continue statin     Prostate cancer (CMS/HCC)  Assessment & Plan  - POD #5 s/p prostatectomy   - Follows with [facility] as outpatient  - Keep stephen in place  - Continue oxybutynin        VTE Assessment: [place] VTE Score: 4  VTE Prophylaxis: Current anticoagulants:  heparin (porcine) bolus from bag 3,450-6,900 Units, intravenous, q6h PRN  heparin infusion in 0.45%  units/mL, intravenous, Titrated      Code Status: Full Code  Palliative Care Screening Score: 0   Discussed advanced care planning.   Estimated Discharge Date: 8/2/2022  Disposition Planning:  Pending hospital course     SWATI Drake  7/31/2022

## 2022-07-31 NOTE — ASSESSMENT & PLAN NOTE
Rate controlled atrial fibrillation  On Cardizem  Apparently not on anticoagulation  Will get outside records from [facility]  Continue heparin for now

## 2022-07-31 NOTE — PLAN OF CARE
Problem: Adult Inpatient Plan of Care  Goal: Plan of Care Review  Flowsheets (Taken 2022 1342)  Plan of Care Reviewed With: patient     Problem: Adult Inpatient Plan of Care  Goal: Patient-Specific Goal (Individualized)  Flowsheets (Taken 2022 1345)  Patient-Specific Goals (Include Timeframe): Pt's goal is to walk out of here healthy.  Anxieties, Fears or Concerns: none expressed     Problem: Adult Inpatient Plan of Care  Goal: Readiness for Transition of Care  Intervention: Mutually Develop Transition Plan  Flowsheets (Taken 2022 1350)  Anticipated Discharge Disposition: home without assistance or services  Transportation Concerns: car, none  Readmission Within the Last 30 Days: no previous admission in last 30 days  Patient/Family Anticipated Services at Transition: none  Patient/Family Anticipates Transition to: home  Transportation Anticipated: (girlfriend) family or friend will provide  Concerns to be Addressed: no discharge needs identified    Met with pt at bedside. Introduced self and Care Coordination Role. Pt verified name and . Pt provided new address and SW updated info.     Current Living Situation: pt lives alone in a condo and reported no steps.   Prior level of functioning: independent   DME/Oxygen: denied both.   Home Care Services/SNF: no home care. Denied SNF in the past 30 days.  Emergency Contact: girlfriend Malcolm Jack, [phone]  AD/POA: no  Pharm[facility] [place]  PCP: Malcolm Aguirre MD; [facility]   Insurance: [facility]  COVID Vaccination Status: Pfizer 21, 21, 3/8/21  : no  Employment: self employed contractor   Transportation: drives   Social Determinates of Health: Pt has access to food, clothing, housing, and basic needs.  Disposition: Pt is for discharge home when medically stable.    Care Coordination will continue to follow for dc planning purposes.

## 2022-07-31 NOTE — PLAN OF CARE
Plan of Care Review  Plan of Care Reviewed With: patient, spouse  Progress: improving  Outcome Summary: pt aaox4. ambulated with SBA. O2-96%RA. CM-Afib. Heparin gtt stopped per MAR. Eliquis to be given.

## 2022-07-31 NOTE — CONSULTS
Pulmonology Consult Note    Reason For Consult: Pulmonary embolism and left apical PTX    HPI: Patient is a 66-year-old male that presented to emergency room due to progressive shortness of breath over the last 24 hours.  He underwent robotic prostatectomy for prostate cancer about 4 days prior to admission at [place].  He was discharged after uneventful surgery with indwelling Smith catheter.  A few days after discharge he is noted shortness of breath on exertional activity which has progressed and prompted the patient to present to the emergency room.  He underwent CT of the chest with PE protocol which was consistent with bilateral pulmonary embolism right greater than left prior to this patient was not anticoagulated though he does carry history of atrial fibrillation.  Currently his shortness of breath has improved but he has been on oxygen and at rest.  He denies any chest pain or hemoptysis.      Past Medical History:  Past Medical History:   Diagnosis Date   • Prostate CA (CMS/HCC)        Past Surgical History:  No past surgical history on file.    Allergies:   Patient has no known allergies.    Medications:  Current Facility-Administered Medications   Medication Dose Route Frequency Provider Last Rate Last Admin   • acetaminophen (TYLENOL) tablet 650 mg  650 mg oral q4h PRN Jez Guy, DO   650 mg at 07/31/22 1230   • atorvastatin (LIPITOR) tablet 80 mg  80 mg oral Daily (6p) Anisha Bowden PA C       • glucose chewable tablet 16-32 g of dextrose  16-32 g of dextrose oral PRN Anisha Bowden PA C        Or   • dextrose 40 % oral gel 15-30 g of dextrose  15-30 g of dextrose oral PRN Anisha Bowden PA C        Or   • glucagon (GLUCAGEN) injection 1 mg  1 mg intramuscular PRN Anisha Bowden PA C        Or   • dextrose 50 % in water (D50) injection 12.5 g  25 mL intravenous PRN Anisha Bowden PA C       • dilTIAZem CD (CARDIZEM CD) 24 hr ER capsule 240 mg  240 mg oral Daily  "Anisha Bowden PA C   240 mg at 07/31/22 0902   • heparin (porcine) bolus from bag 3,450-6,900 Units  40-80 Units/kg (Adjusted) intravenous q6h PRN Anisha Bowden PA C       • heparin infusion in 0.45%  units/mL  100-4,000 Units/hr intravenous Titrated Anisha Bowden PA C 15.5 mL/hr at 07/31/22 0828 1,550 Units/hr at 07/31/22 0828   • oxybutynin (DITROPAN) tablet 5 mg  5 mg oral TID Anisha Bowden PA C   5 mg at 07/31/22 0902   • oxyCODONE (ROXICODONE) immediate release tablet 5 mg  5 mg oral q4h PRN Anisha Bowden PA C   5 mg at 07/31/22 0057   • sennosides-docusate sodium (SENOKOT-S) 8.6-50 mg per tablet 1 tablet  1 tablet oral BID Anisha Bowden PA C   1 tablet at 07/31/22 0902   • sodium chloride 0.9 % infusion   intravenous Continuous Jez Guy  mL/hr at 07/31/22 0904 New Bag at 07/31/22 0904       Social History:  Social History     Socioeconomic History   • Marital status: Single   Tobacco Use   • Smoking status: Never Smoker   • Smokeless tobacco: Never Used   Substance and Sexual Activity   • Alcohol use: Not Currently     Social Determinants of Health     Food Insecurity: No Food Insecurity   • Worried About Running Out of Food in the Last Year: Never true   • Ran Out of Food in the Last Year: Never true       Family History:  No family history on file.    Review of Systems:  Review of Systems   Constitutional: Positive for fatigue. Negative for fever.   Respiratory: Positive for shortness of breath. Negative for cough, wheezing and stridor.    Cardiovascular: Negative for chest pain, palpitations and leg swelling.   All other systems reviewed and are negative.      Objective     Vital Signs for the last 24 hours:  Visit Vitals  BP (!) 137/95 (BP Location: Right upper arm, Patient Position: Lying)   Pulse (!) 113   Temp 37.2 °C (98.9 °F) (Oral)   Resp 16   Ht 1.803 m (5' 11\")   Wt 99.3 kg (219 lb)   SpO2 96%   BMI 30.54 kg/m²     PF Readings from " Last 3 Encounters:   No data found for PF       Oxygen:  Oxygen Therapy: None (Room air)  O2 Delivery Method: Nasal cannula     O2 Flow Rate (L/min): 2 L/min     Physical Exam:  Physical Exam  Vitals and nursing note reviewed.   HENT:      Head: Normocephalic and atraumatic.   Eyes:      General: No scleral icterus.  Cardiovascular:      Rate and Rhythm: Rhythm irregular.      Heart sounds: No murmur heard.  Pulmonary:      Effort: Pulmonary effort is normal.      Breath sounds: Normal breath sounds.   Abdominal:      Palpations: Abdomen is soft.   Musculoskeletal:         General: No swelling.   Skin:     General: Skin is warm.   Neurological:      Mental Status: He is alert and oriented to person, place, and time.   Psychiatric:         Behavior: Behavior normal.         Labs:        No results found for: CHOL, TRIG, HDL, LDLDIRECT, TSH, PSA, HGBA1C, MICROALBUR    Input/Ouput in Last 24 hours:    Intake/Output Summary (Last 24 hours) at 7/31/2022 1300  Last data filed at 7/31/2022 1000  Gross per 24 hour   Intake --   Output 1250 ml   Net -1250 ml       Imaging/Radiology:  Bilateral small pulmonary embolism right greater than left.  Small left apical PTX.  Also known to have pneumoperitoneum from patient's previous surgery    IMPRESSION AND PLAN :    1.  Provoked pulmonary embolism secondary to recent prostatectomy as well as known malignancy  -Patient will need 3 to 6 months of anticoagulation    2.  Left gastrocnemius DVT    3.  Left apical PTX secondary to his robotic prostatectomy  -This is usually related to air from the abdomen escaping through the fascial planes  -Another possibility could be positive pressure ventilation during surgery  -Pneumo should resolve at a rate of 1 to 2 %/day    4.  Risks of anticoagulation discussed with patient at length  -He will follow-up in our office in the next few weeks

## 2022-07-31 NOTE — ASSESSMENT & PLAN NOTE
Patient reports a prostatectomy at [place]  Smith in place  Outpatient follow-up this week  Procedure appears to be laparoscopic based on abdominal incisions  Do not have access to records at this time  Pneumoperitoneum seen on CT likely residual from procedure  Will send for obstruction series this morning  Maintain Smith catheter  Unclear if patient on oxybutynin, possibly in the setting of bladder spasm post prostatectomy

## 2022-07-31 NOTE — NURSING NOTE
Pt received from ER via stretcher accompanied by RN. Pt alert and oriented x's 3. Denies pain or SOB currently. Ambulated to bed with assistance. CM shows afib, heart rate 70's. Heparin gtt infusing at 1550units/hr. 0.9nss infusing at 100cc/hr. Smith cath patent and draining clear yellow urine.  Oriented to unit and nurse call bell. Report given to oncoming RN.

## 2022-07-31 NOTE — PROGRESS NOTES
Hospital Medicine Service -  Daily Progress Note       SUBJECTIVE   Events of admission noted.  Mild shortness of breath reported.  Denies significant pain.  Numerous issues discussed including new pulmonary emboli in the setting of surgery, pneumothorax, pneumoperitoneum.  Awaiting records from [place].     OBJECTIVE      Vital signs in last 24 hours:  Temp:  [36.7 °C (98 °F)-37.2 °C (98.9 °F)] 37.2 °C (98.9 °F)  Heart Rate:  [] 113  Resp:  [12-22] 16  BP: (118-147)/() 137/95    Intake/Output Summary (Last 24 hours) at 7/31/2022 1106  Last data filed at 7/31/2022 1000  Gross per 24 hour   Intake --   Output 1250 ml   Net -1250 ml       PHYSICAL EXAMINATION      General: ill appearing, NAD  HEENT: MMM, PERRL, anicteric sclera   Neck: no JVD  Cardiac: RRR, +S1/S2, no m/r/g  Lungs: Diminished breath sounds, no wheezing  Abdomen: soft, laparoscopic incisions noted  Extremities: no edema, clubbing, cyanosis, distal pulses intact bilaterally   Neuro: AAOx3, nonfocal  Skin: warm, well perfused  Psych: cooperative, appropriate   LABS / IMAGING / TELE      Labs  Results from last 7 days   Lab Units 07/31/22  0815 07/30/22  1548   SODIUM mEQ/L 138 135*   POTASSIUM mEQ/L 4.6 4.2   CHLORIDE mEQ/L 107 101   CO2 mEQ/L 24 26   BUN mg/dL 19 20   CREATININE mg/dL 1.5* 1.6*   GLUCOSE mg/dL 118* 136*   CALCIUM mg/dL 8.6* 8.8*     Results from last 7 days   Lab Units 07/31/22  0815 07/30/22  1548   WBC K/uL 10.22 11.73*   HEMOGLOBIN g/dL 13.8 14.1   HEMATOCRIT % 41.9 43.4   PLATELETS K/uL 179 185     Imaging  I have independently reviewed the pertinent imaging from the last 24 hrs.    ECG/Telemetry  I have independently reviewed the telemetry. Significant findings include Rate controlled atrial fibrillation.    ASSESSMENT AND PLAN      * Pulmonary embolism, other, unspecified chronicity, unspecified whether acute cor pulmonale present (CMS/HCC)  Assessment & Plan  Pt reports sudden onset SOB that started after he went for  a walk  He is POD 5 s/p prostatectomy at [place]  CTA chest shows multiple bilateral segmental and subsegmental pulmonary emboli   BNP and troponin normal, doubt right heart strain    We will continue heparin drip  Pulmonary consulted  Pneumothorax as well as discussed below  Lower extremity ultrasound ordered  Continue telemetry  Transition to DOAC when able    Abnormal CT of the chest  Assessment & Plan  Small left pneumothorax noted, awaiting repeat chest x-ray  Pneumoperitoneum likely in the setting of laparoscopic prostatectomy last week  Known history of prostate cancer, likely bone metastasis  Will obtain records from [place]    Prostate cancer (CMS/[facility])  Assessment & Plan  Patient reports a prostatectomy at [place]  Smith in place  Outpatient follow-up this week  Procedure appears to be laparoscopic based on abdominal incisions  Do not have access to records at this time  Pneumoperitoneum seen on CT likely residual from procedure  Will send for obstruction series this morning  Maintain Smith catheter  Unclear if patient on oxybutynin, possibly in the setting of bladder spasm post prostatectomy      Acute kidney injury (CMS/[facility])  Assessment & Plan  Unclear baseline  Will attempt to access records from [place]  Continue IV fluids  Monitor and replete electrolytes as indicated  Avoid nephrotoxins  Trend BUN/creatinine    Dyslipidemia  Assessment & Plan  Continue statin    Chronic atrial fibrillation (CMS/[facility])  Assessment & Plan  Rate controlled atrial fibrillation  On Cardizem  Apparently not on anticoagulation  Will get outside records from [place]  Continue heparin for now         VTE Assessment: [place] VTE Score: 4  VTE Prophylaxis Plan: Heparin drip  Code Status: Full Code  Estimated Discharge Date: 8/2/2022  Disposition Planning: Continue telemetry     Jez Guy, DO  7/31/2022

## 2022-08-01 VITALS
TEMPERATURE: 98.4 F | BODY MASS INDEX: 30.66 KG/M2 | RESPIRATION RATE: 20 BRPM | DIASTOLIC BLOOD PRESSURE: 96 MMHG | WEIGHT: 219 LBS | HEART RATE: 116 BPM | SYSTOLIC BLOOD PRESSURE: 162 MMHG | HEIGHT: 71 IN | OXYGEN SATURATION: 97 %

## 2022-08-01 LAB
ANION GAP SERPL CALC-SCNC: 9 MEQ/L (ref 3–15)
BUN SERPL-MCNC: 17 MG/DL (ref 8–20)
CALCIUM SERPL-MCNC: 8.7 MG/DL (ref 8.9–10.3)
CHLORIDE SERPL-SCNC: 104 MEQ/L (ref 98–109)
CO2 SERPL-SCNC: 22 MEQ/L (ref 22–32)
CREAT SERPL-MCNC: 1.5 MG/DL (ref 0.8–1.3)
ERYTHROCYTE [DISTWIDTH] IN BLOOD BY AUTOMATED COUNT: 13.4 % (ref 11.6–14.4)
GFR SERPL CREATININE-BSD FRML MDRD: 47 ML/MIN/1.73M*2
GLUCOSE SERPL-MCNC: 106 MG/DL (ref 70–99)
HCT VFR BLDCO AUTO: 41 % (ref 40.1–51)
HGB BLD-MCNC: 13.2 G/DL (ref 13.7–17.5)
MAGNESIUM SERPL-MCNC: 2.4 MG/DL (ref 1.8–2.5)
MCH RBC QN AUTO: 27.7 PG (ref 28–33.2)
MCHC RBC AUTO-ENTMCNC: 32.2 G/DL (ref 32.2–36.5)
MCV RBC AUTO: 86.1 FL (ref 83–98)
PDW BLD AUTO: 10 FL (ref 9.4–12.4)
PHOSPHATE SERPL-MCNC: 4 MG/DL (ref 2.4–4.7)
PLATELET # BLD AUTO: 194 K/UL (ref 150–350)
POTASSIUM SERPL-SCNC: 4.6 MEQ/L (ref 3.6–5.1)
RBC # BLD AUTO: 4.76 M/UL (ref 4.5–5.8)
SODIUM SERPL-SCNC: 135 MEQ/L (ref 136–144)
WBC # BLD AUTO: 9.61 K/UL (ref 3.8–10.5)

## 2022-08-01 PROCEDURE — 83735 ASSAY OF MAGNESIUM: CPT | Performed by: HOSPITALIST

## 2022-08-01 PROCEDURE — G0378 HOSPITAL OBSERVATION PER HR: HCPCS

## 2022-08-01 PROCEDURE — 63700000 HC SELF-ADMINISTRABLE DRUG

## 2022-08-01 PROCEDURE — 63700000 HC SELF-ADMINISTRABLE DRUG: Performed by: HOSPITALIST

## 2022-08-01 PROCEDURE — 80048 BASIC METABOLIC PNL TOTAL CA: CPT | Performed by: HOSPITALIST

## 2022-08-01 PROCEDURE — 84100 ASSAY OF PHOSPHORUS: CPT | Performed by: HOSPITALIST

## 2022-08-01 PROCEDURE — 85027 COMPLETE CBC AUTOMATED: CPT | Performed by: HOSPITALIST

## 2022-08-01 PROCEDURE — 36415 COLL VENOUS BLD VENIPUNCTURE: CPT | Performed by: HOSPITALIST

## 2022-08-01 PROCEDURE — 99217 PR OBSERVATION CARE DISCHARGE MANAGEMENT: CPT | Performed by: HOSPITALIST

## 2022-08-01 RX ADMIN — SENNOSIDES AND DOCUSATE SODIUM 1 TABLET: 50; 8.6 TABLET ORAL at 08:06

## 2022-08-01 RX ADMIN — DILTIAZEM HYDROCHLORIDE 240 MG: 240 CAPSULE, COATED, EXTENDED RELEASE ORAL at 08:06

## 2022-08-01 RX ADMIN — APIXABAN 10 MG: 5 TABLET, FILM COATED ORAL at 08:06

## 2022-08-01 RX ADMIN — OXYBUTYNIN CHLORIDE 5 MG: 5 TABLET ORAL at 08:06

## 2022-08-01 NOTE — DISCHARGE SUMMARY
Hospital Medicine Service -  Inpatient Discharge Summary        BRIEF OVERVIEW   Admitting Provider: David FLORES MD  Attending Provider: Jez Guy DO Attending phys phone: [phone]    PCP: Andre Whitley, No Pcp [phone]    Admission Date: 7/30/2022  Discharge Date: 8/1/2022     DISCHARGE DIAGNOSES      Primary Discharge Diagnosis  Pulmonary embolism, other, unspecified chronicity, unspecified whether acute cor pulmonale present (CMS/[facility])    Secondary Discharge Diagnoses  Active Hospital Problems    Diagnosis Date Noted   • Pulmonary embolism, other, unspecified chronicity, unspecified whether acute cor pulmonale present (CMS/[facility]) 07/30/2022     Priority: High   • Abnormal CT of the chest 07/31/2022     Priority: Medium   • Prostate cancer (CMS/[facility]) 07/30/2022     Priority: Medium   • Acute kidney injury (CMS/[facility]) 07/31/2022     Priority: Low   • Chronic atrial fibrillation (CMS/[facility]) 07/30/2022     Priority: Low   • Dyslipidemia 07/30/2022     Priority: Low      Resolved Hospital Problems   No resolved problems to display.       SUMMARY OF HOSPITALIZATION      Hospital Course  Srinivas is a 66-year-old male with a past medical history of prostate cancer status post robotic laparoscopic prostatectomy, chronic atrial fibrillation on anticoagulation, presents with acute onset shortness of breath.  Patient was found to have acute bilateral pulmonary emboli, no evidence of right heart strain.  Cardiac biomarkers were normal.  He was found to have a left gastrocnemius vein thrombosis.  In addition, he was found to have a small left apical pneumothorax.  Evaluated by pulmonary and stated that the pneumothorax is likely in the setting of robotic surgery, from air entering from the abdomen.  He did have residual pneumoperitoneum as well.  He was started on heparin, but transitioned to Eliquis.  He required no oxygen, was able to ambulate without difficulty.  This is likely a provoked event in the  setting of recent surgery.  He will see pulmonary to assist with management going forward.  He will follow-up with urology today for voiding trial, prophylactic Levaquin given yesterday.  Recommend chest x-ray in 6 to 8 weeks to document resolution of pneumothorax.  Discussed with patient at length at discharge.  Stable for discharge home.    DC time 35 min: discussing plan of care with patient, coordinating care for discharge, med [facility] [place] instructions.     Exam on Day of Discharge  Physical Exam  Patient seen and examined on the day of discharge.    Consults During Admission  IP CONSULT TO PULMONOLOGY/SLEEP MEDICINE    DISCHARGE MEDICATIONS   New, changed, or stopped medications from this admission:       Medication List      START taking these medications    apixaban 5 mg tablet  Commonly known as: ELIQUIS  Start taking on: August 1, 2022  Take 2 tablets (10 mg total) by mouth 2 (two) times a day for 7 days, THEN 1 tablet (5 mg total) 2 (two) times a day.        CONTINUE taking these medications    acetaminophen 500 mg tablet  Commonly known as: TYLENOL  Take 500 mg by mouth 2 (two) times a day.  Dose: 500 mg     atorvastatin 80 mg tablet  Commonly known as: LIPITOR  Take 80 mg by mouth.  Dose: 80 mg     dilTIAZem  mg 24 hr capsule  Commonly known as: CARDIZEM CD  Take by mouth once daily.     oxybutynin 5 mg tablet  Commonly known as: DITROPAN  Take 5 mg by mouth 3 (three) times a day.  Dose: 5 mg     oxyCODONE 5 mg immediate release tablet  Commonly known as: ROXICODONE  Take 5 mg by mouth every 4 (four) hours as needed.  Dose: 5 mg     sennosides-docusate sodium 8.6-50 mg  Commonly known as: SENOKOT-S  Take 1 tablet by mouth 2 (two) times a day.  Dose: 1 tablet        STOP taking these medications    levoFLOXacin 500 mg tablet  Commonly known as: LEVAQUIN            Complete list of medications at discharge:      Medication List      START taking these medications    apixaban 5 mg tablet  Commonly known  as: ELIQUIS  Start taking on: August 1, 2022  Take 2 tablets (10 mg total) by mouth 2 (two) times a day for 7 days, THEN 1 tablet (5 mg total) 2 (two) times a day.        CONTINUE taking these medications    acetaminophen 500 mg tablet  Commonly known as: TYLENOL  Take 500 mg by mouth 2 (two) times a day.  Dose: 500 mg     atorvastatin 80 mg tablet  Commonly known as: LIPITOR  Take 80 mg by mouth.  Dose: 80 mg     dilTIAZem  mg 24 hr capsule  Commonly known as: CARDIZEM CD  Take by mouth once daily.     oxybutynin 5 mg tablet  Commonly known as: DITROPAN  Take 5 mg by mouth 3 (three) times a day.  Dose: 5 mg     oxyCODONE 5 mg immediate release tablet  Commonly known as: ROXICODONE  Take 5 mg by mouth every 4 (four) hours as needed.  Dose: 5 mg     sennosides-docusate sodium 8.6-50 mg  Commonly known as: SENOKOT-S  Take 1 tablet by mouth 2 (two) times a day.  Dose: 1 tablet        STOP taking these medications    levoFLOXacin 500 mg tablet  Commonly known as: LEVAQUIN                 PROCEDURES / LABS / IMAGING        Pertinent Imaging  X-RAY OBSTRUCTION SERIES (ABDOMEN 2 VIEWS WITH CHEST 1 VIEW)    Addendum Date: 7/31/2022    Note patient's small left apical pneumothorax is not significantly changed since the CT of 7/30/2022 accounting for differences in technique.  Dr. Guy is aware of the unchanged left pneumothorax and pneumoperitoneum on the obstruction series.    Result Date: 7/31/2022  IMPRESSION: Pneumoperitoneum is similar to that seen on 7/30/2022 study. Etiology of pneumoperitoneum is not evident on the obstruction series.  CT abdomen pelvis with IV contrast follow-up can be utilized clinically warranted based on patient's of follow-up of the known pneumoperitoneum    CT ANGIOGRAPHY CHEST PULMONARY EMBOLISM WITH IV CONTRAST    Result Date: 7/30/2022  IMPRESSION: 1.  Multiple bilateral segmental and subsegmental pulmonary emboli. 2.  Small left pneumothorax. 3.  Pneumoperitoneum in the  visualized upper abdomen. 4.  Bibasilar atelectasis. 5.  Few tiny indeterminate sclerotic foci in the thoracic spine.  Consider outpatient bone scan for further evaluation Finding: New pneumothorax.  Pulmonary embolism. New pneumoperitoneum.   Acuity: Critical  Status:  CLOSED Critical read back was performed and results were read back by CLIVE PERDUE M.D. on 7/30/2022 7:02 PM As per [place] Act 112, known as the Patient Test Result Information Act, a letter will be sent to the patient to notify them that a significant abnormality may exist.     X-RAY CHEST 1 VIEW    Result Date: 7/30/2022  IMPRESSION: No active disease in the chest.     Ultrasound venous leg bilateral    Result Date: 7/31/2022  IMPRESSION: Left gastrocnemius vein thrombus. The results were critically read back with Dr. YSABEL DRAKE on 7/31/2022 12:07 PM.       OUTPATIENT  FOLLOW-UP / REFERRALS / PENDING TESTS        Outpatient Follow-Up Appointments  Encounter Information    This patient does not currently have any appointments scheduled.       DISCHARGE DISPOSITION      Disposition: Home     Code Status At Discharge: Full Code    Physician Order for Life-Sustaining Treatment Document Status      No documents found

## 2022-08-01 NOTE — PLAN OF CARE
Problem: Adult Inpatient Plan of Care  Goal: Plan of Care Review  Outcome: Progressing  Flowsheets (Taken 8/1/2022 0818)  Progress: improving  Plan of Care Reviewed With: patient  Outcome Summary:   0815: Pt for discharge today, has appointment at [place]. Delivered eliquis coupon to bedside, instructed pt on bringing it to pharmacy. Reviewed IMM, pt signed. No further cc needs anticipated.       nonimmune

## 2022-08-01 NOTE — DISCHARGE INSTRUCTIONS
Eliquis 10mg twice daily for 7 days THEN 5mg twice daily indefinitely.    Please see pulmonary to discuss further treatment.     No other medications changes.

## 2022-08-01 NOTE — NURSING NOTE
Pt DC per orders.  Pt anxious to leave due to urology appointment.  Pt verbalized understanding of discharge instructions and new meds. IVs removed.

## 2022-08-01 NOTE — PLAN OF CARE
Plan of Care Review  Plan of Care Reviewed With: patient  Progress: improving  Outcome Summary: Pt aaox3, treated with OXY for abdominal pain 7/10 with effect. first dose eliquis given. abd puncture sites LAURA. stephen care provided.  Problem: Adult Inpatient Plan of Care  Goal: Plan of Care Review  Outcome: Progressing  Flowsheets (Taken 8/1/2022 0442)  Progress: improving  Plan of Care Reviewed With: patient  Outcome Summary: Pt aaox3, treated with OXY for abdominal pain 7/10 with effect. first dose eliquis given. abd puncture sites LAURA. stephen care provided.     Problem: Adult Inpatient Plan of Care  Goal: Patient-Specific Goal (Individualized)  Flowsheets (Taken 8/1/2022 0442)  Patient-Specific Goals (Include Timeframe): To go home  Individualized Care Needs: pain management and breathing pattern monitor

## 2022-08-01 NOTE — PLAN OF CARE
Problem: Adult Inpatient Plan of Care  Goal: Plan of Care Review  Outcome: Met  Goal: Patient-Specific Goal (Individualized)  Outcome: Met  Goal: Absence of Hospital-Acquired Illness or Injury  Outcome: Met  Goal: Optimal Comfort and Wellbeing  Outcome: Met  Goal: Readiness for Transition of Care  Outcome: Met     Problem: Venous Thromboembolism  Goal: VTE Symptom Resolution  Outcome: Met     Problem: Fluid Imbalance (Acute Kidney Injury/Impairment)  Goal: Optimal Fluid Balance  Outcome: Met     Problem: Renal Function Impairment (Acute Kidney Injury/Impairment)  Goal: Effective Renal Function  Outcome: Met     Problem: Dysrhythmia  Goal: Normalized Cardiac Rhythm  Outcome: Met       Hiren IRENE

## 2022-08-02 NOTE — UM PHYSICIAN REVIEW NOTE
Utilization Secondary Review Note      Patient Name: Srinivas Constantino      MRN: [id number]  Insurance: [facility]  Admission date: 7/30/2022   ref #[id number]   Member ID [id number]    67yo afib, not on AC.  Prostate CA s/p prostatectomy 7/26 7/30 With SOB, recent prostate surg.  D dimer >20.  CTA chest shows multiple bilateral pulmonary emboli, small left pneumothorax, pneumoperitoneum in the visualized upper abdomen, bibasilar atelectasis. In rate controlled afib.  Heparin drip.  Cr 1.6 .  95% 1L    7/31 Cr 1.5. .  Seen by pulm.  PTX attributed to air from the prostatectomy, vs pos pressure vent.   PO oxy x 3.  F/u pneumoperitoneum and ptx unchanged.  PO oxy x 3.  98% 3L, Weaned to RA at noon.  Heparin transitioned to Eliquis.  Ambulating on RA    8/1 d/c'd 8am, senior living care overnight.  Not calling for P2P      Moreno Ray MD  8/2/2022